# Patient Record
Sex: MALE | Race: WHITE | NOT HISPANIC OR LATINO | URBAN - NONMETROPOLITAN AREA
[De-identification: names, ages, dates, MRNs, and addresses within clinical notes are randomized per-mention and may not be internally consistent; named-entity substitution may affect disease eponyms.]

---

## 2021-09-17 PROBLEM — E78.00 PURE HYPERCHOLESTEROLEMIA: Chronic | Status: CHRONIC | Noted: 2021-09-17

## 2021-09-17 PROBLEM — D35.2 BENIGN NEOPLASM OF PITUITARY GLAND (CMS/HCC): Chronic | Status: CHRONIC | Noted: 2021-09-17

## 2021-09-17 PROBLEM — E29.1 TESTICULAR HYPOFUNCTION: Chronic | Status: CHRONIC | Noted: 2021-09-17

## 2021-09-17 PROBLEM — I10 BENIGN ESSENTIAL HYPERTENSION: Chronic | Status: CHRONIC | Noted: 2021-09-17

## 2024-02-27 ENCOUNTER — OFFICE (OUTPATIENT)
Dept: URBAN - NONMETROPOLITAN AREA CLINIC 1 | Facility: CLINIC | Age: 54
End: 2024-02-27

## 2024-02-27 VITALS
WEIGHT: 197 LBS | HEIGHT: 77 IN | DIASTOLIC BLOOD PRESSURE: 78 MMHG | HEART RATE: 79 BPM | SYSTOLIC BLOOD PRESSURE: 113 MMHG

## 2024-02-27 DIAGNOSIS — I10 ESSENTIAL (PRIMARY) HYPERTENSION: ICD-10-CM

## 2024-02-27 DIAGNOSIS — R11.0 NAUSEA: ICD-10-CM

## 2024-02-27 DIAGNOSIS — Z87.19 PERSONAL HISTORY OF OTHER DISEASES OF THE DIGESTIVE SYSTEM: ICD-10-CM

## 2024-02-27 DIAGNOSIS — E78.5 HYPERLIPIDEMIA, UNSPECIFIED: ICD-10-CM

## 2024-02-27 PROCEDURE — 36415 COLL VENOUS BLD VENIPUNCTURE: CPT | Performed by: NURSE PRACTITIONER

## 2024-02-27 PROCEDURE — 99214 OFFICE O/P EST MOD 30 MIN: CPT | Performed by: NURSE PRACTITIONER

## 2024-02-27 NOTE — SERVICEHPINOTES
He presents today with complaints of nausea, every day for the past 2 years.   He has self-limited spells of sudden-onset hot flashes and nausea that can last most of the day, and then eventually he feels better.     He has noticed that water especially makes him really sick, and stress makes his symptoms worse.   No complaints of dysphagia or dark stools.  He says he has lost about 30 lb over the past 2 years. However, in reviewing his [facility] records, his weights since 1995 were between 194-207 lbs.     Today he weighs 197, which would be his norm.      His chronic illnesses include hypertension, hyperlipidemia, and IBS. He admits to having lots of anxiety and stress.    He has had pituitary tumor in the past.br
brEGD and colonoscopy 12/20/21 by Dr. Avery:
br Impression and Recommendations:brDistal esophageal peptic stricture dilated to 60 Frenchbr4 colon polyps removedbrSmall internal hemorrhoidsbrNo mucosal abnormalities identified to explain his change in bowel habits 
br
brFinal Pathologic Diagnosis:br   1.   SMALL BOWEL, ENDOSCOPIC BIOPSY:br     Benign fragments of duodenal mucosa without significant histopathology.br   2.   COLON, RANDOM ENDOSCOPIC BIOPSIES:br     Benign fragments of colonic mucosa without significant histopathology.br     Incidental benign fragment of small intestinal mucosa without significant histopathology.br   3.   COLON, ASCENDING, ENDOSCOPIC BIOPSY:br     Fragments of traditional serrated adenoma ( adenomatous polyp).br   4.   COLON, TRANSVERSE, ENDOSCOPIC BIOPSY:br     Fragments of traditional serrated adenoma (adenomatous polyp). 
br   Advised repeat colonoscopy in 5 years.